# Patient Record
Sex: MALE
[De-identification: names, ages, dates, MRNs, and addresses within clinical notes are randomized per-mention and may not be internally consistent; named-entity substitution may affect disease eponyms.]

---
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
## 2020-07-06 ENCOUNTER — NURSE TRIAGE (OUTPATIENT)
Dept: NURSING | Facility: CLINIC | Age: 20
End: 2020-07-06

## 2020-07-06 NOTE — TELEPHONE ENCOUNTER
Wyatt struck foot on trampoline yesterday (7/5/20).  No unable to bear weight, pain present to arch of foot to heel.  Ankle painful when pointing toes.  Triaged to ED.         Additional Information    Negative: Dislocated knee cap suspected    Negative: Looks like a broken bone (crooked or deformed)    Negative: Skin is split open or gaping (if unsure, refer in if cut length > 1/2 inch or 12 mm)    Won't stand (bear weight) or walk    Protocols used: LEG INJURY-P-OH      COVID 19 Nurse Triage Plan/Patient Instructions    Please be aware that novel coronavirus (COVID-19) may be circulating in the community. If you develop symptoms such as fever, cough, or SOB or if you have concerns about the presence of another infection including coronavirus (COVID-19), please contact your health care provider or visit [email or web address].     Disposition/Instructions    Patient to go to ED and follow protocol based instructions.     Bring Your Own Device:  Please also bring your smart device(s) (smart phones, tablets, laptops) and their charging cables for your personal use and to communicate with your care team during your visit.    Thank you for taking steps to prevent the spread of this virus.  o Limit your contact with others.  o Wear a simple mask to cover your cough.  o Wash your hands well and often.    Resources    [facility]: About COVID-19: [email or web address]    CDC: What to Do If You're Sick: www.cdc.gov/coronavirus/2019-ncov/about/steps-when-sick.html    CDC: Ending Home Isolation: www.cdc.gov/coronavirus/2019-ncov/hcp/disposition-in-home-patients.html     CDC: Caring for Someone: www.cdc.gov/coronavirus/2019-ncov/if-you-are-sick/care-for-someone.html     [facility]: Interim Guidance for Hospital Discharge to Home: www.health.[facility].[place]/diseases/coronavirus/hcp/hospdischarge.pdf    [facility] clinical trials (COVID-19 research studies): [email or web address]-trials     Below are  the COVID-19 hotlines at the [place] [facility] ([facility]). Interpreters are available.   o For health questions: Call 658-564-4295 or 1-499.920.2326 (7 a.m. to 7 p.m.)  o For questions about schools and childcare: Call 815-120-9426 or 1-167.830.9298 (7 a.m. to 7 p.m.)